# Patient Record
(demographics unavailable — no encounter records)

---

## 2024-10-28 NOTE — DATA REVIEWED
[de-identified] : US today with right lower thyroid nodule measuring 3.3 cm and left lower thyroid nodule measuring 2.7 cm.   [de-identified] : CTA from February 2024 with large thyroid gland with substernal extension on the left with slight tracheal deviation without significant compression or narrowing of the lumen.

## 2024-10-28 NOTE — CONSULT LETTER
[Dear  ___] : Dear  [unfilled], [Consult Letter:] : I had the pleasure of evaluating your patient, [unfilled]. [Please see my note below.] : Please see my note below. [Consult Closing:] : Thank you very much for allowing me to participate in the care of this patient.  If you have any questions, please do not hesitate to contact me. [Sincerely,] : Sincerely, [FreeTextEntry2] : Dr. Holly Solitario [FreeTextEntry3] : Brennen Childs MD FACS Division of Head and Neck Surgery Department of Otolaryngology  [facility]   of Otolaryngology Assistant Professor of Surgery [facility] [employer] at [facility]

## 2024-10-28 NOTE — DATA REVIEWED
[de-identified] : US today with right lower thyroid nodule measuring 3.3 cm and left lower thyroid nodule measuring 2.7 cm.   [de-identified] : CTA from February 2024 with large thyroid gland with substernal extension on the left with slight tracheal deviation without significant compression or narrowing of the lumen.

## 2024-10-28 NOTE — PROCEDURE
[FreeTextEntry1] : US FNA of right and left thyroid nodules [FreeTextEntry2] : Bilateral thyroid nodules [FreeTextEntry3] : After patient gave consent, the lesion in the right thyroid was visualized with US with findings noted above.  The overlying skin was prepped with alcohol.  Under US guidance, a FNA was performed with multiple passes of a 22 gauge needle.  The specimen was sent to Cytology.  No hematoma.     The lesion in the left thyroid was visualized with US with findings noted above.  The overlying skin was prepped with alcohol.  Under US guidance, a FNA was performed with multiple passes of a 22 gauge needle.  The specimen was sent to Cytology.  No hematoma.  Patient tolerated the procedure well.  [Gag Reflex] : gag reflex preventing mirror examination [None] : none [Flexible Endoscope] : examined with the flexible endoscope [Serial Number: ___] : Serial Number: [unfilled] [de-identified] : After patient consents, a FFL is performed.  No lesions in the NPx, OPx, HPx or larynx.  VC are mobile, airway patent.

## 2024-10-28 NOTE — CONSULT LETTER
[Dear  ___] : Dear  [unfilled], [Consult Letter:] : I had the pleasure of evaluating your patient, [unfilled]. [Please see my note below.] : Please see my note below. [Consult Closing:] : Thank you very much for allowing me to participate in the care of this patient.  If you have any questions, please do not hesitate to contact me. [Sincerely,] : Sincerely, [FreeTextEntry2] : Dr. Holly Solitario [FreeTextEntry3] : Brennen Childs MD FACS Division of Head and Neck Surgery Department of Otolaryngology  [facility]   of Otolaryngology Assistant Professor of Surgery [facility] [employer] at [facility]

## 2024-10-28 NOTE — PHYSICAL EXAM
[de-identified] : Multinodular thyroid gland, no LAD. [Midline] : trachea located in midline position [Laryngoscopy Performed] : laryngoscopy was performed, see procedure section for findings [Normal] : no rashes

## 2024-10-28 NOTE — PHYSICAL EXAM
[de-identified] : Multinodular thyroid gland, no LAD. [Midline] : trachea located in midline position [Laryngoscopy Performed] : laryngoscopy was performed, see procedure section for findings [Normal] : no rashes

## 2024-10-28 NOTE — PROCEDURE
[FreeTextEntry1] : US FNA of right and left thyroid nodules [FreeTextEntry2] : Bilateral thyroid nodules [FreeTextEntry3] : After patient gave consent, the lesion in the right thyroid was visualized with US with findings noted above.  The overlying skin was prepped with alcohol.  Under US guidance, a FNA was performed with multiple passes of a 22 gauge needle.  The specimen was sent to Cytology.  No hematoma.     The lesion in the left thyroid was visualized with US with findings noted above.  The overlying skin was prepped with alcohol.  Under US guidance, a FNA was performed with multiple passes of a 22 gauge needle.  The specimen was sent to Cytology.  No hematoma.  Patient tolerated the procedure well.  [Gag Reflex] : gag reflex preventing mirror examination [None] : none [Flexible Endoscope] : examined with the flexible endoscope [Serial Number: ___] : Serial Number: [unfilled] [de-identified] : After patient consents, a FFL is performed.  No lesions in the NPx, OPx, HPx or larynx.  VC are mobile, airway patent.

## 2024-10-28 NOTE — HISTORY OF PRESENT ILLNESS
[de-identified] : This is a patient referred by Holly Flores for thyroid nodules sono 10/16/2024 multiples b/l nodule, recommend bx on right 3.4cm and left 2.5 cm This was incidental finding during carotid US recently.  No dysphagia, dysphonia or dyspnea. Patient denies h/o radiation and has no family h/o thyroid cancer.

## 2024-10-28 NOTE — HISTORY OF PRESENT ILLNESS
[de-identified] : This is a patient referred by Holly Flores for thyroid nodules sono 10/16/2024 multiples b/l nodule, recommend bx on right 3.4cm and left 2.5 cm This was incidental finding during carotid US recently.  No dysphagia, dysphonia or dyspnea. Patient denies h/o radiation and has no family h/o thyroid cancer.

## 2025-02-18 NOTE — PROCEDURE
[FreeTextEntry1] : US FNA of right and left thyroid nodules [FreeTextEntry2] : Bilateral thyroid nodules [FreeTextEntry3] : After patient gave consent, the lesion in the right thyroid was visualized with US with findings noted above.  The overlying skin was prepped with alcohol.  Under US guidance, a FNA was performed with multiple passes of a 22 gauge needle.  The specimen was sent to Cytology.  No hematoma.     The lesion in the left thyroid was visualized with US with findings noted above.  The overlying skin was prepped with alcohol.  Under US guidance, a FNA was performed with multiple passes of a 22 gauge needle.  The specimen was sent to Cytology.  No hematoma.  Patient tolerated the procedure well.  [Gag Reflex] : gag reflex preventing mirror examination [None] : none [Flexible Endoscope] : examined with the flexible endoscope [Serial Number: ___] : Serial Number: [unfilled] [de-identified] : After patient consents, a FFL is performed.  No lesions in the NPx, OPx, HPx or larynx.  VC are mobile, airway patent.

## 2025-02-18 NOTE — PROCEDURE
[FreeTextEntry1] : US FNA of right and left thyroid nodules [FreeTextEntry2] : Bilateral thyroid nodules [FreeTextEntry3] : After patient gave consent, the lesion in the right thyroid was visualized with US with findings noted above.  The overlying skin was prepped with alcohol.  Under US guidance, a FNA was performed with multiple passes of a 22 gauge needle.  The specimen was sent to Cytology.  No hematoma.     The lesion in the left thyroid was visualized with US with findings noted above.  The overlying skin was prepped with alcohol.  Under US guidance, a FNA was performed with multiple passes of a 22 gauge needle.  The specimen was sent to Cytology.  No hematoma.  Patient tolerated the procedure well.  [Gag Reflex] : gag reflex preventing mirror examination [None] : none [Flexible Endoscope] : examined with the flexible endoscope [Serial Number: ___] : Serial Number: [unfilled] [de-identified] : After patient consents, a FFL is performed.  No lesions in the NPx, OPx, HPx or larynx.  VC are mobile, airway patent.

## 2025-02-24 NOTE — CONSULT LETTER
[Dear  ___] : Dear  [unfilled], [Consult Letter:] : I had the pleasure of evaluating your patient, [unfilled]. [Please see my note below.] : Please see my note below. [Consult Closing:] : Thank you very much for allowing me to participate in the care of this patient.  If you have any questions, please do not hesitate to contact me. [Sincerely,] : Sincerely, [Courtesy Letter:] : I had the pleasure of seeing your patient, [unfilled], in my office today. [FreeTextEntry2] : Dr. Holly Solitario [FreeTextEntry3] : Brennen Childs MD FACS Division of Head and Neck Surgery Department of Otolaryngology  [facility]   of Otolaryngology Assistant Professor of Surgery [facility] [employer] at [facility]

## 2025-02-24 NOTE — CONSULT LETTER
[Dear  ___] : Dear  [unfilled], [Consult Letter:] : I had the pleasure of evaluating your patient, [unfilled]. [Please see my note below.] : Please see my note below. [Consult Closing:] : Thank you very much for allowing me to participate in the care of this patient.  If you have any questions, please do not hesitate to contact me. [Sincerely,] : Sincerely, [Courtesy Letter:] : I had the pleasure of seeing your patient, [unfilled], in my office today. [FreeTextEntry2] : Dr. Holly Solitario [FreeTextEntry3] : Brennen Childs MD FACS Division of Head and Neck Surgery Department of Otolaryngology  [facility]   of Otolaryngology Assistant Professor of Surgery [facility] [employer] at [facility]

## 2025-02-24 NOTE — DATA REVIEWED
[de-identified] : US thyroid today R. lobe measures 3.3 x 3.1 x 5.8 cm.  There is a nodule measuring 3.16 x 2.31 x 3.48 cm, TR3, stable.  There is a lower pole nodule which measures 2.14 x 2.26 x 2.48 cm, stable. L. lobe measures 3.6 x 3.1 x 5.7 cm.  There is a lower pole nodule which measures 2.71 x 2.48 x 2.42 cm, stable.  There is a nodule superior to this which measures 2.01 x 1.48 x 2.32 cm, stable. Isthmus 5mm. No LAD. [de-identified] : CTA from February 2024 with large thyroid gland with substernal extension on the left with slight tracheal deviation without significant compression or narrowing of the lumen.

## 2025-02-24 NOTE — HISTORY OF PRESENT ILLNESS
[de-identified] : This is a patient referred by Holly Flores for thyroid nodules sono 10/16/2024 multiples b/l nodule, recommend bx on right 3.4cm and left 2.5 cm This was incidental finding during carotid US recently.  FNA 10/28/2024 b/l nodules  Benign thyroid nodule with cystic component Pt is here for 4 months f/u and has no new c.o, no change in size of the neck. No dysphagia, dysphonia or dyspnea. Patient denies h/o radiation and has no family h/o thyroid cancer.

## 2025-02-24 NOTE — PHYSICAL EXAM
[Midline] : trachea located in midline position [Laryngoscopy Performed] : laryngoscopy was performed, see procedure section for findings [Normal] : no rashes [de-identified] : Multinodular thyroid gland, no LAD.

## 2025-02-24 NOTE — HISTORY OF PRESENT ILLNESS
[de-identified] : This is a patient referred by Holly Flores for thyroid nodules sono 10/16/2024 multiples b/l nodule, recommend bx on right 3.4cm and left 2.5 cm This was incidental finding during carotid US recently.  FNA 10/28/2024 b/l nodules  Benign thyroid nodule with cystic component Pt is here for 4 months f/u and has no new c.o, no change in size of the neck. No dysphagia, dysphonia or dyspnea. Patient denies h/o radiation and has no family h/o thyroid cancer.

## 2025-02-24 NOTE — DATA REVIEWED
[de-identified] : US thyroid today R. lobe measures 3.3 x 3.1 x 5.8 cm.  There is a nodule measuring 3.16 x 2.31 x 3.48 cm, TR3, stable.  There is a lower pole nodule which measures 2.14 x 2.26 x 2.48 cm, stable. L. lobe measures 3.6 x 3.1 x 5.7 cm.  There is a lower pole nodule which measures 2.71 x 2.48 x 2.42 cm, stable.  There is a nodule superior to this which measures 2.01 x 1.48 x 2.32 cm, stable. Isthmus 5mm. No LAD. [de-identified] : CTA from February 2024 with large thyroid gland with substernal extension on the left with slight tracheal deviation without significant compression or narrowing of the lumen.

## 2025-02-24 NOTE — PHYSICAL EXAM
[Midline] : trachea located in midline position [Laryngoscopy Performed] : laryngoscopy was performed, see procedure section for findings [Normal] : no rashes [de-identified] : Multinodular thyroid gland, no LAD.

## 2025-07-25 NOTE — REVIEW OF SYSTEMS
[Negative] : Heme/Lymph [Abdominal Pain] : abdominal pain [History of kidney stones] : history of kidney stones [see HPI] : see HPI [Anxiety] : anxiety

## 2025-07-26 NOTE — HISTORY OF PRESENT ILLNESS
[Flank Pain] : flank pain [1] : 1 [Left Flank] : left flank [None] : There is no radiation [Dull] : dull [FreeTextEntry1] : 66 y/o M PMH Umbilical hernia, Kidney stones 25 years ago referred by Dr. Bull s/p visits to [facility] 7/19/25 and [facility] 7/22/25 for Left Flank pain. CT A/P 7/19/25 findings of 6mm calculus proximal left ureter, 4mm Left renal calculus and 1cm Right renal calculus. Reports passed a stone on 7/23/25 and left flank pain and hematuria resolved.  Currently has 1/10 left flank pain. Denies fever, chills, dysuria, hematuria, N/V, AUR. + Hx Left Kidney stones w lithotripsy and left ureter stent 25 years ago. Had L kidney stones for 2 years. Denies hx of prostate CA, bladder CA, Smoking (quit 40 yrs ago) and Etoh consumption.  [Urinary Incontinence] : no urinary incontinence [Urinary Retention] : no urinary retention [Urinary Urgency] : no urinary urgency [Urinary Frequency] : no urinary frequency [Nocturia] : no nocturia [Straining] : no straining [Weak Stream] : no weak stream [Intermittency] : no intermittency [Post-Void Dribbling] : no post-void dribbling [Erectile Dysfunction] : no Erectile Dysfunction [Dysuria] : no dysuria [Hematuria - Gross] : no gross hematuria [Abdominal Pain] : no abdominal pain [Fever] : no fever [Nausea] : no nausea [Anorexia] : no anorexia

## 2025-07-26 NOTE — HISTORY OF PRESENT ILLNESS
[FreeTextEntry1] : 66 y/o M for initial urological evaluation  Anxiety, Multinodular thyroid (in endo f/u) No FH of malignancy  Former smoker, quit 30 yrs ago   s/p stent placement + ESWL 2014~Dr. Acuna  Saw Dr. Goldman 2017 after passing stone (100% uric acid) Reviewed notes, 8 mm left renal pelvic calculus, additional right intrarenal calculus No metabolic evaluation performed, not on any alkalizing medication  Has not passed stone since then   Pt had left flank pain  Pt [place] stone at park while drinking water Went to [place], pain became severe, CT performed, found to have kidney stones and discharged with MET  No stent placed  Pt still with flank pain, mild dull throbbing, pink tinged urine.   Reviewed CT - 6 mm stone in proximal left ureter + mild left hydronephrosis. 13 mm right intrarenal calculus + 4 mm left intrarenal calculus  Reviewed labs: CRE 1.00, WBC 9.58, UA/Reflex nit neg, trace yvonne, +blood     Discussed URS. Risks, benefits and alternatives discussed. Procedure, in hospital stay and recovery explained. Risk of infection, multiple procedures, ureteral injury, ureteral stricture, incomplete stone clearance, sepsis, bleeding, and retention, all discussed. Discussed PCNL Procedure, in hospital stay and recovery discussed with the patient. Potential complications of bleeding, transfusion, selective angioembolization if indicated, adjacent organ injury, fever, sepsis, infection, incomplete stone clearance, bowel or other unusual injury. chest complications, vascular injuries, procedures needed to address any complications, ureteral injury, anesthetic complications, all discussed. Printed information including all of the above and more provided to the patient.  patient with severe agoraphobia and claustrophobia has significant discomfort and concern with performing any procedure, paxton imaging, and if he needs anything done he needs the OT to be on the first floor Pt prefer to have stone treatment at [facility]  discussed with Dr. Montes and presented the case to him  Plan -Start K citrate  -Already on tamsulosin and NSAIDs  -Refer to Dr. Montes   -Will need Left URS +/- stent  -Pt would prefer lithotripsy for right side  -Recommend future metabolic evaluation

## 2025-07-26 NOTE — ASSESSMENT
[FreeTextEntry1] : 64 y/o M PMH Umbilical Hernia, kidney stones w  Renal calculus (1cm right renal calculus, 4mm left renal calculus)                          - Check UA, UCx                         - FU in 6 months to schedule Cysto, ureteroscopy, lithotripsy, poss stent insertion                           -Discussed option to schedule urs sooner if wishes to postpone umbilical hernia sx  Prior to appointment and during encounter with patient extensive medical records were reviewed including but not limited to, hospital records, outpatient records, imaging results, and lab data. More than 50% of the encounter was spent counseling the patient on workup, disease course and treatment/management including abnormal image results, differential diagnoses, treatment options, risk and benefits, lifestyle changes, current condition, and current medications was performed. Patient's comments, questions, and concerns were addressed, and patient verbalized understanding. During this appointment the patient was examined, diagnoses were discussed and explained in a face-to-face manner.

## 2025-07-26 NOTE — HISTORY OF PRESENT ILLNESS
[FreeTextEntry1] : 64 y/o M for initial urological evaluation  Anxiety, Multinodular thyroid (in endo f/u) No FH of malignancy  Former smoker, quit 30 yrs ago   s/p stent placement + ESWL 2014~Dr. Acuna  Saw Dr. Goldman 2017 after passing stone (100% uric acid) Reviewed notes, 8 mm left renal pelvic calculus, additional right intrarenal calculus No metabolic evaluation performed, not on any alkalizing medication  Has not passed stone since then   Pt had left flank pain  Pt tried to pass stone at park while drinking water Went to [place], pain became severe, CT performed, found to have kidney stones and discharged with MET  No stent placed  Pt still with flank pain, mild dull throbbing, pink tinged urine.   Reviewed CT - 6 mm stone in proximal left ureter + mild left hydronephrosis. 13 mm right intrarenal calculus + 4 mm left intrarenal calculus  Reviewed labs: CRE 1.00, WBC 9.58, UA/Reflex nit neg, trace yvonne, +blood     Discussed URS. Risks, benefits and alternatives discussed. Procedure, in hospital stay and recovery explained. Risk of infection, multiple procedures, ureteral injury, ureteral stricture, incomplete stone clearance, sepsis, bleeding, and retention, all discussed. Discussed PCNL Procedure, in hospital stay and recovery discussed with the patient. Potential complications of bleeding, transfusion, selective angioembolization if indicated, adjacent organ injury, fever, sepsis, infection, incomplete stone clearance, bowel or other unusual injury. chest complications, vascular injuries, procedures needed to address any complications, ureteral injury, anesthetic complications, all discussed. Printed information including all of the above and more provided to the patient.  patient with severe agoraphobia and claustrophobia has significant discomfort and concern with performing any procedure, paxton imaging, and if he needs anything done he needs the OT to be on the first floor Pt prefer to have stone treatment at [place]  discussed with Dr. Montes and presented the case to him  Plan -Start K citrate  -Already on tamsulosin and NSAIDs  -Refer to Dr. Montes   -Will need Left URS +/- stent  -Pt would prefer lithotripsy for right side  -Recommend future metabolic evaluation

## 2025-07-26 NOTE — HISTORY OF PRESENT ILLNESS
[Flank Pain] : flank pain [1] : 1 [Left Flank] : left flank [None] : There is no radiation [Dull] : dull [FreeTextEntry1] : 64 y/o M PMH Umbilical hernia, Kidney stones 25 years ago referred by Dr. Bull s/p visits to [facility] 7/19/25 and [facility] 7/22/25 for Left Flank pain. CT A/P 7/19/25 findings of 6mm calculus proximal left ureter, 4mm Left renal calculus and 1cm Right renal calculus. Reports passed a stone on 7/23/25 and left flank pain and hematuria resolved.  Currently has 1/10 left flank pain. Denies fever, chills, dysuria, hematuria, N/V, AUR. + Hx Left Kidney stones w lithotripsy and left ureter stent 25 years ago. Had L kidney stones for 2 years. Denies hx of prostate CA, bladder CA, Smoking (quit 40 yrs ago) and Etoh consumption.  [Urinary Incontinence] : no urinary incontinence [Urinary Retention] : no urinary retention [Urinary Urgency] : no urinary urgency [Urinary Frequency] : no urinary frequency [Nocturia] : no nocturia [Straining] : no straining [Weak Stream] : no weak stream [Intermittency] : no intermittency [Post-Void Dribbling] : no post-void dribbling [Erectile Dysfunction] : no Erectile Dysfunction [Dysuria] : no dysuria [Hematuria - Gross] : no gross hematuria [Abdominal Pain] : no abdominal pain [Fever] : no fever [Nausea] : no nausea [Anorexia] : no anorexia

## 2025-07-26 NOTE — PHYSICAL EXAM
[General Appearance - Well Developed] : well developed [General Appearance - Well Nourished] : well nourished [Normal Appearance] : normal appearance [] : no respiratory distress [Exaggerated Use Of Accessory Muscles For Inspiration] : no accessory muscle use [Abdomen Soft] : soft [Abdomen Tenderness] : non-tender [Costovertebral Angle Tenderness] : no ~M costovertebral angle tenderness [Normal Station and Gait] : the gait and station were normal for the patient's age [Skin Color & Pigmentation] : normal skin color and pigmentation [No Focal Deficits] : no focal deficits [Oriented To Time, Place, And Person] : oriented to person, place, and time [Chaperone Present] : A chaperone was present in the examining room during all aspects of the physical examination [FreeTextEntry2] : FRANKIE Arthur

## 2025-07-26 NOTE — ASSESSMENT
[FreeTextEntry1] :  Plan -Start K citrate  -Already on tamsulosin and NSAIDs  -Refer to Dr. Montes   -Will need Left URS +/- stent  -Pt would prefer lithotripsy for right side  -Recommend future metabolic evaluation

## 2025-07-26 NOTE — ASSESSMENT
[FreeTextEntry1] : 66 y/o M PMH Umbilical Hernia, kidney stones w  Renal calculus (1cm right renal calculus, 4mm left renal calculus)                          - Check UA, UCx                         - FU in 6 months to schedule Cysto, ureteroscopy, lithotripsy, poss stent insertion                           -Discussed option to schedule urs sooner if wishes to postpone umbilical hernia sx  Prior to appointment and during encounter with patient extensive medical records were reviewed including but not limited to, hospital records, outpatient records, imaging results, and lab data. More than 50% of the encounter was spent counseling the patient on workup, disease course and treatment/management including abnormal image results, differential diagnoses, treatment options, risk and benefits, lifestyle changes, current condition, and current medications was performed. Patient's comments, questions, and concerns were addressed, and patient verbalized understanding. During this appointment the patient was examined, diagnoses were discussed and explained in a face-to-face manner.